# Patient Record
Sex: MALE | Race: WHITE | NOT HISPANIC OR LATINO | Employment: OTHER | URBAN - METROPOLITAN AREA
[De-identification: names, ages, dates, MRNs, and addresses within clinical notes are randomized per-mention and may not be internally consistent; named-entity substitution may affect disease eponyms.]

---

## 2021-03-18 ENCOUNTER — IMMUNIZATION (OUTPATIENT)
Dept: VACCINE CLINIC | Facility: HOSPITAL | Age: 46
End: 2021-03-18

## 2021-03-18 PROCEDURE — 0001A: CPT | Performed by: INTERNAL MEDICINE

## 2021-03-18 PROCEDURE — 91300 HC SARSCOV02 VAC 30MCG/0.3ML IM: CPT | Performed by: INTERNAL MEDICINE

## 2021-04-08 ENCOUNTER — IMMUNIZATION (OUTPATIENT)
Dept: VACCINE CLINIC | Facility: HOSPITAL | Age: 46
End: 2021-04-08

## 2021-04-08 PROCEDURE — 91300 HC SARSCOV02 VAC 30MCG/0.3ML IM: CPT | Performed by: INTERNAL MEDICINE

## 2021-04-08 PROCEDURE — 0002A: CPT | Performed by: INTERNAL MEDICINE

## 2022-09-30 ENCOUNTER — OFFICE VISIT (OUTPATIENT)
Dept: INTERNAL MEDICINE | Facility: CLINIC | Age: 47
End: 2022-09-30

## 2022-09-30 VITALS
HEART RATE: 70 BPM | WEIGHT: 198.3 LBS | HEIGHT: 71 IN | OXYGEN SATURATION: 97 % | BODY MASS INDEX: 27.76 KG/M2 | DIASTOLIC BLOOD PRESSURE: 74 MMHG | SYSTOLIC BLOOD PRESSURE: 114 MMHG

## 2022-09-30 DIAGNOSIS — R19.05 PERIUMBILICAL MASS: ICD-10-CM

## 2022-09-30 DIAGNOSIS — Z00.00 HEALTHCARE MAINTENANCE: Primary | ICD-10-CM

## 2022-09-30 DIAGNOSIS — F17.210 CIGARETTE SMOKER: ICD-10-CM

## 2022-09-30 DIAGNOSIS — Z12.5 PROSTATE CANCER SCREENING: ICD-10-CM

## 2022-09-30 PROCEDURE — 99213 OFFICE O/P EST LOW 20 MIN: CPT | Performed by: FAMILY MEDICINE

## 2022-09-30 PROCEDURE — 99386 PREV VISIT NEW AGE 40-64: CPT | Performed by: FAMILY MEDICINE

## 2022-09-30 PROCEDURE — 90686 IIV4 VACC NO PRSV 0.5 ML IM: CPT | Performed by: FAMILY MEDICINE

## 2022-09-30 PROCEDURE — 90471 IMMUNIZATION ADMIN: CPT | Performed by: FAMILY MEDICINE

## 2022-09-30 NOTE — PROGRESS NOTES
Subjective   Juan Erazo is a 47 y.o. male.     Chief Complaint   Patient presents with   • new patient visit   • Annual Exam     Health maintenance    History of Present Illness   Juan Erazo 47 y.o. male who presents for an Annual Wellness Visit.  he has a history of   Patient Active Problem List   Diagnosis   • Healthcare maintenance   • Cigarette smoker   • Prostate cancer screening   • Periumbilical mass   .  he has been feeling well.   I  reviewed health maintenance with him as part of my preventative care plan.  Has regular dental and eye exams  The following portions of the patient's history were reviewed and updated as appropriate: allergies, current medications, past family history, past medical history, past social history, past surgical history and problem list.    Review of Systems   Constitutional: Negative for appetite change, fever and unexpected weight change.   HENT: Negative for ear pain, facial swelling and sore throat.    Eyes: Negative for pain and visual disturbance.   Respiratory: Negative for chest tightness, shortness of breath and wheezing.    Cardiovascular: Negative for chest pain and palpitations.   Gastrointestinal: Negative for abdominal pain and blood in stool.   Endocrine: Negative.    Genitourinary: Negative for difficulty urinating and hematuria.   Musculoskeletal: Negative for joint swelling.   Neurological: Negative for tremors, seizures and syncope.   Hematological: Negative for adenopathy.   Psychiatric/Behavioral: Negative.        Objective   Physical Exam  Vitals and nursing note reviewed.   Constitutional:       Appearance: Normal appearance. He is well-developed. He is not diaphoretic.   HENT:      Head: Normocephalic and atraumatic.      Right Ear: Tympanic membrane, ear canal and external ear normal.      Left Ear: Tympanic membrane, ear canal and external ear normal.   Eyes:      General: Lids are normal. No scleral icterus.     Extraocular Movements:  Extraocular movements intact.      Conjunctiva/sclera: Conjunctivae normal.   Neck:      Thyroid: No thyroid mass or thyromegaly.      Vascular: No carotid bruit or JVD.   Cardiovascular:      Rate and Rhythm: Normal rate and regular rhythm.      Pulses: Normal pulses.           Radial pulses are 2+ on the right side and 2+ on the left side.      Heart sounds: Normal heart sounds. No murmur heard.  Pulmonary:      Effort: Pulmonary effort is normal. No respiratory distress.      Breath sounds: Normal breath sounds.   Abdominal:      Palpations: Abdomen is soft.      Tenderness: There is no right CVA tenderness or left CVA tenderness.   Musculoskeletal:      Cervical back: Normal range of motion.      Right lower leg: No edema.      Left lower leg: No edema.   Skin:     General: Skin is warm and dry.      Coloration: Skin is not pale.      Findings: No erythema or rash.   Neurological:      General: No focal deficit present.      Mental Status: He is alert and oriented to person, place, and time.      Sensory: No sensory deficit.      Deep Tendon Reflexes: Reflexes are normal and symmetric.   Psychiatric:         Mood and Affect: Mood normal.         Behavior: Behavior normal. Behavior is cooperative.         Thought Content: Thought content normal.         Judgment: Judgment normal.         Assessment & Plan   Diagnoses and all orders for this visit:    1. Healthcare maintenance (Primary)  -     FluLaval/Fluarix/Fluzone >6 Months  -     Hepatitis C Antibody  -     Comprehensive Metabolic Panel  -     Lipid Panel  -     Cologuard - Stool, Per Rectum; Future    2. Prostate cancer screening  -     PSA Screen    3. Cigarette smoker    4. Periumbilical mass    Continue attempts at healthy lifestyle calorie appropriate diet and regular physical activity  Patient counseled regarding early detection of cancers with screening Cologuard has been ordered as well as PSA  Recommend smoking cessation  Ongoing management of  chronic medical problems or acute issues as needed otherwise preventatively annual

## 2022-09-30 NOTE — PROGRESS NOTES
"Chief Complaint  new patient visit and Annual Exam    Subjective        Juan Erazo presents to [facility]  History of Present Illness  New patient with acute problem of nodule around his umbilicus that he has noticed over the last few weeks there is no change in bowel or bladder habits he has had a distant laparoscopic cholecystectomy is not specifically tender  Objective   Vital Signs:  /74 (BP Location: Right arm, Patient Position: Sitting, Cuff Size: Adult)   Pulse 70   Ht 180.3 cm (71\")   Wt 89.9 kg (198 lb 4.8 oz)   SpO2 97%   BMI 27.66 kg/m²   Estimated body mass index is 27.66 kg/m² as calculated from the following:    Height as of this encounter: 180.3 cm (71\").    Weight as of this encounter: 89.9 kg (198 lb 4.8 oz).          Physical Exam  Abdominal:          Comments: Well-healed scars small nodule superior umbilicus  Has an accompanying ventral diastases        Result Review :                Assessment and Plan   Diagnoses and all orders for this visit:    1. Healthcare maintenance (Primary)  -     FluLaval/Fluarix/Fluzone >6 Months  -     Hepatitis C Antibody  -     Comprehensive Metabolic Panel  -     Lipid Panel  -     Cologuard - Stool, Per Rectum; Future    2. Prostate cancer screening  -     PSA Screen    3. Cigarette smoker    4. Periumbilical mass  -     Ambulatory Referral to General Surgery    Patient will consult general surgery for suspected incisional hernia         Follow Up   Return if symptoms worsen or fail to improve, for Recheck.  Patient was given instructions and counseling regarding his condition or for health maintenance advice. Please see specific information pulled into the AVS if appropriate.       "

## 2022-10-11 ENCOUNTER — LAB (OUTPATIENT)
Dept: LAB | Facility: HOSPITAL | Age: 47
End: 2022-10-11

## 2022-10-11 LAB
ALBUMIN SERPL-MCNC: 4.5 G/DL (ref 3.5–5.2)
ALBUMIN/GLOB SERPL: 1.9 G/DL
ALP SERPL-CCNC: 68 U/L (ref 39–117)
ALT SERPL W P-5'-P-CCNC: 18 U/L (ref 1–41)
ANION GAP SERPL CALCULATED.3IONS-SCNC: 8.1 MMOL/L (ref 5–15)
AST SERPL-CCNC: 15 U/L (ref 1–40)
BILIRUB SERPL-MCNC: 0.7 MG/DL (ref 0–1.2)
BUN SERPL-MCNC: 12 MG/DL (ref 6–20)
BUN/CREAT SERPL: 11.8 (ref 7–25)
CALCIUM SPEC-SCNC: 9.6 MG/DL (ref 8.6–10.5)
CHLORIDE SERPL-SCNC: 106 MMOL/L (ref 98–107)
CHOLEST SERPL-MCNC: 167 MG/DL (ref 0–200)
CO2 SERPL-SCNC: 26.9 MMOL/L (ref 22–29)
CREAT SERPL-MCNC: 1.02 MG/DL (ref 0.76–1.27)
EGFRCR SERPLBLD CKD-EPI 2021: 90.1 ML/MIN/1.73
GLOBULIN UR ELPH-MCNC: 2.4 GM/DL
GLUCOSE SERPL-MCNC: 115 MG/DL (ref 65–99)
HCV AB SER DONR QL: NORMAL
HDLC SERPL-MCNC: 40 MG/DL (ref 40–60)
LDLC SERPL CALC-MCNC: 97 MG/DL (ref 0–100)
LDLC/HDLC SERPL: 2.31 {RATIO}
POTASSIUM SERPL-SCNC: 4.6 MMOL/L (ref 3.5–5.2)
PROT SERPL-MCNC: 6.9 G/DL (ref 6–8.5)
PSA SERPL-MCNC: 0.55 NG/ML (ref 0–4)
SODIUM SERPL-SCNC: 141 MMOL/L (ref 136–145)
TRIGL SERPL-MCNC: 173 MG/DL (ref 0–150)
VLDLC SERPL-MCNC: 30 MG/DL (ref 5–40)

## 2022-10-11 PROCEDURE — 80053 COMPREHEN METABOLIC PANEL: CPT | Performed by: FAMILY MEDICINE

## 2022-10-11 PROCEDURE — G0103 PSA SCREENING: HCPCS | Performed by: FAMILY MEDICINE

## 2022-10-11 PROCEDURE — 86803 HEPATITIS C AB TEST: CPT | Performed by: FAMILY MEDICINE

## 2022-10-11 PROCEDURE — 36415 COLL VENOUS BLD VENIPUNCTURE: CPT | Performed by: FAMILY MEDICINE

## 2022-10-11 PROCEDURE — 80061 LIPID PANEL: CPT | Performed by: FAMILY MEDICINE

## 2022-10-12 DIAGNOSIS — R73.09 ELEVATED GLUCOSE: Primary | ICD-10-CM

## 2022-10-20 ENCOUNTER — OFFICE VISIT (OUTPATIENT)
Dept: SURGERY | Facility: CLINIC | Age: 47
End: 2022-10-20

## 2022-10-20 VITALS — BODY MASS INDEX: 27.89 KG/M2 | WEIGHT: 199.2 LBS | HEIGHT: 71 IN

## 2022-10-20 DIAGNOSIS — K42.9 UMBILICAL HERNIA WITHOUT OBSTRUCTION AND WITHOUT GANGRENE: Primary | ICD-10-CM

## 2022-10-20 PROCEDURE — 99203 OFFICE O/P NEW LOW 30 MIN: CPT | Performed by: SURGERY

## 2022-10-20 NOTE — PROGRESS NOTES
Subjective   Juan Erazo is a 47 y.o. male who presents to the office in surgical consultation from Kishor Sanchez MD for an umbilical nodule.    History of Present Illness     The patient recently noticed a painful nodule at the umbilicus.  He tried to feel the area that was uncomfortable and applied pressure which cause it to reduce.  The pain was a burning pain that was immediately relieved when he reduced it.  He now has intermittent symptoms that are mild and resolved with reduction.  He has not noticed a visible bulge.  He has not had any change in his bowel or bladder function.  He had a laparoscopic cholecystectomy in 2010 with an umbilical port site.    Review of Systems   Constitutional: Negative for fatigue and fever.   Respiratory: Negative for chest tightness and shortness of breath.    Cardiovascular: Negative for chest pain and palpitations.   Gastrointestinal: Positive for abdominal pain. Negative for blood in stool, constipation, diarrhea, nausea and vomiting.     Past Medical History:   Diagnosis Date   • Cholelithiasis approx. 2010    Emergency gallbladder removal surgery at [facility]   • PONV (postoperative nausea and vomiting) approx 2010    After gallbladder surgery I vomited immediately after waking up from the surgery     Past Surgical History:   Procedure Laterality Date   • GALLBLADDER SURGERY      2010     Family History   Problem Relation Age of Onset   • Lung cancer Father    • Emphysema Father    • Cancer Father         Dad was diagnosed with lung cancer and passed away in 2015     Social History     Socioeconomic History   • Marital status: Unknown   Tobacco Use   • Smoking status: Every Day     Packs/day: 1.00     Years: 15.00     Pack years: 15.00     Types: Cigarettes   • Smokeless tobacco: Never   • Tobacco comments:     Have quit several times, mostly for a few months at a time...   Vaping Use   • Vaping Use: Never used   Substance and Sexual Activity   •  Alcohol use: Yes     Alcohol/week: 3.0 standard drinks     Types: 3 Cans of beer per week     Comment: 2-3 per week   • Drug use: Never   • Sexual activity: Not Currently     Partners: Female       Objective   Physical Exam  Constitutional:       Appearance: Normal appearance. He is well-developed. He is not toxic-appearing.   Eyes:      General: No scleral icterus.  Pulmonary:      Effort: Pulmonary effort is normal. No respiratory distress.   Abdominal:      Palpations: Abdomen is soft.      Tenderness: There is no abdominal tenderness.      Hernia: A hernia is present. Hernia is present in the umbilical area (Small reducible umbilical hernia that contains preperitoneal fat).   Skin:     General: Skin is warm and dry.   Neurological:      Mental Status: He is alert and oriented to person, place, and time.   Psychiatric:         Behavior: Behavior normal.         Thought Content: Thought content normal.         Judgment: Judgment normal.         Assessment & Plan     1.  Umbilical hernia: The patient has a small umbilical hernia that is intermittently symptomatic.  It is easily reducible.  The defect is too small to permit the small bowel.  This was discussed with the patient.  Based on the small size of the umbilical hernia, there is no need to proceed with an umbilical hernia repair at this time.  The patient was advised that he has no limitations in his activity.  He will return to the office if the symptoms worsen or if he starts to notice a bulge at the umbilicus.

## 2023-10-06 ENCOUNTER — OFFICE VISIT (OUTPATIENT)
Dept: INTERNAL MEDICINE | Facility: CLINIC | Age: 48
End: 2023-10-06
Payer: COMMERCIAL

## 2023-10-06 ENCOUNTER — HOSPITAL ENCOUNTER (OUTPATIENT)
Dept: GENERAL RADIOLOGY | Facility: HOSPITAL | Age: 48
Discharge: HOME | End: 2023-10-06
Admitting: FAMILY MEDICINE
Payer: COMMERCIAL

## 2023-10-06 VITALS
DIASTOLIC BLOOD PRESSURE: 68 MMHG | TEMPERATURE: 97.8 F | OXYGEN SATURATION: 98 % | SYSTOLIC BLOOD PRESSURE: 110 MMHG | HEIGHT: 71 IN | BODY MASS INDEX: 28.25 KG/M2 | HEART RATE: 87 BPM | WEIGHT: 201.8 LBS

## 2023-10-06 DIAGNOSIS — Z00.00 HEALTHCARE MAINTENANCE: Primary | ICD-10-CM

## 2023-10-06 DIAGNOSIS — Z23 FLU VACCINE NEED: ICD-10-CM

## 2023-10-06 DIAGNOSIS — M79.671 PAIN OF RIGHT HEEL: ICD-10-CM

## 2023-10-06 DIAGNOSIS — Z12.5 PROSTATE CANCER SCREENING: ICD-10-CM

## 2023-10-06 DIAGNOSIS — F17.210 CIGARETTE SMOKER: ICD-10-CM

## 2023-10-06 PROCEDURE — 73650 X-RAY EXAM OF HEEL: CPT

## 2023-10-06 NOTE — PROGRESS NOTES
"Chief Complaint  Foot Pain    Subjective        Juan Erazo presents to [facility]  History of Present Illness  Acute right heel pain last few months seem be worse with treadmill he stopped and seem to improve she was her body-year-old the emergency room he is every day no serious 3 of foot problems  No known trauma  Objective   Vital Signs:  /68   Pulse 87   Temp 97.8 °F (36.6 °C)   Ht 180.3 cm (70.98\")   Wt 91.5 kg (201 lb 12.8 oz)   SpO2 98%   BMI 28.16 kg/m²   Estimated body mass index is 28.16 kg/m² as calculated from the following:    Height as of this encounter: 180.3 cm (70.98\").    Weight as of this encounter: 91.5 kg (201 lb 12.8 oz).               Physical Exam  Musculoskeletal:        Feet:    Feet:      Right foot:      Skin integrity: Callus, dry skin and fissure present.      Comments: No medial or lateral foot around the heel tenderness       Result Review :                   Assessment and Plan   Diagnoses and all orders for this visit:        1. Pain of right heel  -     XR calcaneUS 2+ vw right    Trial of heel cup follow-up results of x-ray         Follow Up   Return if symptoms worsen or fail to improve, for Recheck.  Patient was given instructions and counseling regarding his condition or for health maintenance advice. Please see specific information pulled into the AVS if appropriate.         "

## 2023-10-06 NOTE — PROGRESS NOTES
Subjective   Juan Erazo is a 48 y.o. male.     Chief Complaint   Patient presents with    Annual Exam         History of Present Illness   Juan Erazo 48 y.o. male who presents for an Annual Wellness Visit.  he has a history of   Patient Active Problem List   Diagnosis    Healthcare maintenance    Cigarette smoker    Prostate cancer screening    Periumbilical mass    Pain of right heel   .  he has been feeling well.   I  reviewed health maintenance with him as part of my preventative care plan.  The following portions of the patient's history were reviewed and updated as appropriate: allergies, current medications, past family history, past medical history, past social history, past surgical history, and problem list.  [facility] Low-Dose Lung Cancer CT Screening     Juan Erazo is a 48 y.o.  male with whom I discussed Low-Dose Lung Cancer Screening today.     SMOKING HISTORY:   Social History     Tobacco Use   Smoking Status Every Day    Packs/day: 1.00    Years: 15.00    Pack years: 15.00    Types: Cigarettes   Smokeless Tobacco Never   Tobacco Comments    Have quit several times, mostly for a few months at a time...       Juan Erazo is currently smoking 1 pack(s) per day with a 30 pack year history.  he reports no use of alternate forms of tobacco, electronic cigarettes, marijuana or other substances.  Based on the recommendation of the [place] Preventive Services Task Force, this patient is at high risk for lung cancer and a low-dose CT screening scan is recommended.     We discussed the connection between Radon and Lung Cancer and the availability of free test kits. Mr. Erazo has had  second-hand smoke exposure as a child with smoking in their home.    The patient has had no hemoptysis, unintentional weight loss or increasing shortness of breath. The patient is asymptomatic and has no signs or symptoms of lung cancer.     Together we discussed the potential benefits  and potential harms of being screened for lung cancer including the potential for follow up diagnostic testing, risk for over diagnosis, false positive rate and radiation exposure using the [facility] standardized decision aid. We reviewed the patient's smoking history and counseled on the importance and health benefits of maintaining cigarette smoking abstinence.      Smoking Abstinence  DISCUSSION HELD TODAY:     We discussed that there are a number of resources and interventions to assist with smoking cessation if needed in the future including the 1-800-Quit Now line, Health Department programs, [place] [facility] resources, and use of the [place] Department of Health and Human Services five keys for quitting and quit plan   Recommendations for continued lung cancer screening:      We discussed the NCCN guidelines for lung cancer screening and the patient verbalized understanding that annual screening is recommended until fifteen years beyond smoking as long as they have no other disease or comorbidity that would prevent them from receiving cancer treatments such as surgery should a lung cancer be detected. The [facility] Lung Cancer Screening Shared Decision-Making Tool was utilized as an aid in discussing whether or not screening was right. The patient has decided to proceed with a Low Dose Lung Cancer Screening CT today. The patient is aware that the results of his screening will be shared with the referring provider or PCP as well.       The patient verbalizes understanding that results of this low dose lung CT exam will be called to him and that assistance will be provided in arranging any necessary follow-up.    After review of the NCCN guidelines and recommendations for ongoing screening, the patient verbalized understanding of recommendations for follow-up. We discussed the importance of continued annual screening until 15 years beyond smoking or until other life-limiting  comorbidities are present. We discussed the impact of comorbidities and ability and willing to undergo diagnosis and treatment.    3 minutes out of 20 minutes face-to-face spent counseling patient on the continued health benefits of having quit tobacco, maintaining smoking abstinence, smoking cessation strategies and resources, as well as the importance of adherence to annual lung cancer low-dose CT screening.   Review of Systems   Constitutional:  Negative for appetite change, fever and unexpected weight change.   HENT:  Negative for ear pain, facial swelling and sore throat.    Eyes:  Negative for pain and visual disturbance.   Respiratory:  Negative for chest tightness, shortness of breath and wheezing.    Cardiovascular:  Negative for chest pain and palpitations.   Gastrointestinal:  Negative for abdominal pain and blood in stool.   Endocrine: Negative.    Genitourinary:  Negative for difficulty urinating and hematuria.   Musculoskeletal:  Negative for joint swelling.   Neurological:  Negative for tremors, seizures and syncope.   Hematological:  Negative for adenopathy.   Psychiatric/Behavioral: Negative.       Objective   Physical Exam  Vitals and nursing note reviewed.   Constitutional:       Appearance: Normal appearance. He is well-developed. He is not diaphoretic.   HENT:      Head: Normocephalic and atraumatic.      Right Ear: Tympanic membrane, ear canal and external ear normal.      Left Ear: Tympanic membrane, ear canal and external ear normal.   Eyes:      General: Lids are normal. No scleral icterus.     Extraocular Movements: Extraocular movements intact.      Conjunctiva/sclera: Conjunctivae normal.   Neck:      Thyroid: No thyroid mass or thyromegaly.      Vascular: No carotid bruit or JVD.   Cardiovascular:      Rate and Rhythm: Normal rate and regular rhythm.      Pulses: Normal pulses.           Radial pulses are 2+ on the right side and 2+ on the left side.      Heart sounds: Normal heart  sounds. No murmur heard.  Pulmonary:      Effort: Pulmonary effort is normal. No respiratory distress.      Breath sounds: Normal breath sounds.   Abdominal:      Palpations: Abdomen is soft.      Tenderness: There is no right CVA tenderness or left CVA tenderness.   Musculoskeletal:      Cervical back: Normal range of motion.      Right lower leg: No edema.      Left lower leg: No edema.   Skin:     General: Skin is warm and dry.      Coloration: Skin is not pale.      Findings: No erythema or rash.   Neurological:      General: No focal deficit present.      Mental Status: He is alert and oriented to person, place, and time.      Sensory: No sensory deficit.      Deep Tendon Reflexes: Reflexes are normal and symmetric.   Psychiatric:         Mood and Affect: Mood normal.         Behavior: Behavior normal. Behavior is cooperative.         Thought Content: Thought content normal.         Judgment: Judgment normal.       Assessment & Plan   Diagnoses and all orders for this visit:    1. Healthcare maintenance (Primary)  -     Hemoglobin A1c; Future  -     Lipid Panel; Future  -     Fluzone High-Dose 65+yrs    2. Cigarette smoker  -     CT Chest Low Dose Wo; Future    3. Prostate cancer screening  -     PSA Screen; Future    4. Pain of right heel  -     XR calcaneUS 2+ vw right      Continue attempts at healthy lifestyle calorie appropriate diet and regular physical activity  Education provided regarding prevention of serious illness with immunizations recommend shingles vaccine flu vaccine pneumonia vaccine  Provided regarding early detection and screening patient is current with colorectal cancer screening we will obtain PSA as well as low-dose CT scan due to smoking history  Follow-up ongoing management of chronic problems otherwise as needed or preventatively annually

## 2023-10-12 DIAGNOSIS — M79.671 PAIN OF RIGHT HEEL: Primary | ICD-10-CM

## 2023-11-07 ENCOUNTER — LAB (OUTPATIENT)
Dept: LAB | Facility: HOSPITAL | Age: 48
End: 2023-11-07
Payer: COMMERCIAL

## 2023-11-07 ENCOUNTER — HOSPITAL ENCOUNTER (OUTPATIENT)
Dept: CT IMAGING | Facility: HOSPITAL | Age: 48
Discharge: HOME | End: 2023-11-07
Admitting: FAMILY MEDICINE
Payer: COMMERCIAL

## 2023-11-07 DIAGNOSIS — F17.210 CIGARETTE SMOKER: ICD-10-CM

## 2023-11-07 PROCEDURE — G0103 PSA SCREENING: HCPCS | Performed by: FAMILY MEDICINE

## 2023-11-07 PROCEDURE — 80061 LIPID PANEL: CPT | Performed by: FAMILY MEDICINE

## 2023-11-07 PROCEDURE — 71271 CT THORAX LUNG CANCER SCR C-: CPT

## 2023-11-07 PROCEDURE — 83036 HEMOGLOBIN GLYCOSYLATED A1C: CPT | Performed by: FAMILY MEDICINE

## 2023-11-21 ENCOUNTER — OFFICE VISIT (OUTPATIENT)
Dept: PODIATRY | Facility: CLINIC | Age: 48
End: 2023-11-21
Payer: COMMERCIAL

## 2023-11-21 VITALS — BODY MASS INDEX: 28.15 KG/M2 | RESPIRATION RATE: 18 BRPM | WEIGHT: 201.1 LBS | HEIGHT: 71 IN

## 2023-11-21 DIAGNOSIS — B35.3 TINEA PEDIS OF RIGHT FOOT: ICD-10-CM

## 2023-11-21 DIAGNOSIS — M72.2 PLANTAR FASCIITIS, RIGHT: ICD-10-CM

## 2023-11-21 DIAGNOSIS — M77.31 HEEL SPUR, RIGHT: ICD-10-CM

## 2023-11-21 DIAGNOSIS — L85.3 XEROSIS OF SKIN: Primary | ICD-10-CM

## 2023-11-21 RX ORDER — CLOTRIMAZOLE AND BETAMETHASONE DIPROPIONATE 10; .64 MG/G; MG/G
1 CREAM TOPICAL 2 TIMES DAILY
Qty: 45 G | Refills: 2 | Status: SHIPPED | OUTPATIENT
Start: 2023-11-21

## 2023-11-21 RX ORDER — METHYLPREDNISOLONE 4 MG/1
TABLET ORAL
Qty: 21 TABLET | Refills: 0 | Status: SHIPPED | OUTPATIENT
Start: 2023-11-21

## 2023-11-21 NOTE — PROGRESS NOTES
"11/21/2023  Foot and Ankle Surgery - New Patient   Provider: NINA Barillas   Location: [facility]    Subjective:  Juan Erazo is a 48 y.o. male.     Chief Complaint   Patient presents with    Right Foot - Pain       The patient is here today as a new patient with right heel pain.    The patient reports pain in his right arch and heel that began 02/2023. He was walking on the treadmill every day, but one day it got so bad and he was limping, causing him to take one week off. He denies any fall or injury. He has utilized Tylenol for his pain. He notes the pain is worse in the morning. He describes his foot as feeling \"tight\" and \"twisted\" but eventually loosens up. The patient reports feeling aching and throbbing if he ambulates or stands on his feet for a couple of hours. The patient works from home for [employer]. He rates the pain as 5/10 first thing in the morning. He completed an x-ray in 10/2022 and was advised he had a bone spur. He thought it might be plantar fasciitis. The patient notes the pain occasionally radiates up his leg.    He reports dry skin around his feet and itching between his toes. He reports spots on his chest that are discolored when he goes out in the sun. He notes he has had them all his life. He denies ever seeing a dermatologist.    No Known Allergies    Past Medical History:   Diagnosis Date    Cholelithiasis approx. 2010    Emergency gallbladder removal surgery at [facility]    Difficulty walking 3/2023    Right foot sore first thing in the morning and when i walk or stand for couple hours    PONV (postoperative nausea and vomiting) approx 2010    After gallbladder surgery I vomited immediately after waking up from the surgery       Past Surgical History:   Procedure Laterality Date    GALLBLADDER SURGERY      2010       Family History   Problem Relation Age of Onset    Lung cancer Father     Emphysema Father     Cancer Father         Dad was " "diagnosed with lung cancer and passed away in 2015       Social History     Socioeconomic History    Marital status: Unknown   Tobacco Use    Smoking status: Every Day     Packs/day: 0.50     Years: 15.00     Additional pack years: 0.00     Total pack years: 7.50     Types: Cigarettes    Smokeless tobacco: Never    Tobacco comments:     Have quit several times, mostly for a few months at a time...   Vaping Use    Vaping Use: Never used   Substance and Sexual Activity    Alcohol use: Yes     Alcohol/week: 3.0 standard drinks of alcohol     Types: 3 Cans of beer per week     Comment: 2-3 per week    Drug use: Never    Sexual activity: Not Currently     Partners: Female        No current outpatient medications on file prior to visit.     No current facility-administered medications on file prior to visit.       Objective   Resp 18   Ht 180.3 cm (71\")   Wt 91.2 kg (201 lb 1.6 oz)   BMI 28.05 kg/m²     Foot/Ankle Exam    GENERAL  Appearance:  appears stated age  Orientation:  AAOx3  Affect:  appropriate  Gait:  unimpaired  Assistance:  independent    VASCULAR     Right Foot Vascularity   Normal vascular exam    Dorsalis pedis:  2+  Posterior tibial:  2+  Skin temperature:  warm  Edema grading:  None  CFT:  < 3 seconds  Pedal hair growth:  Present  Varicosities:  none     NEUROLOGIC     Right Foot Neurologic   Light touch sensation: normal    MUSCULOSKELETAL     Right Foot Musculoskeletal   Ecchymosis:  none  Tenderness:  plantar arch tenderness and plantar fascia tenderness      MUSCLE STRENGTH     Right Foot Muscle Strength   Foot dorsiflexion:  5  Foot plantar flexion:  5     Left Foot Muscle Strength   Foot dorsiflexion:  5  Foot plantar flexion:  5    DERMATOLOGIC      Right Foot Dermatologic   Skin  Positive for dryness.   Nails  1.  Normal.  2.  Normal.  4.  Normal.  5.  Normal.        Assessment & Plan   Diagnoses and all orders for this visit:    1. Xerosis of skin (Primary)    2. Tinea pedis of right " foot    3. Plantar fasciitis, right    4. Heel spur, right    Other orders  -     methylPREDNISolone (MEDROL) 4 MG dose pack; Take as directed on package instructions.  Dispense: 21 tablet; Refill: 0  -     clotrimazole-betamethasone (LOTRISONE) 1-0.05 % cream; Apply 1 application  topically to the appropriate area as directed 2 (Two) Times a Day. Apply to affected area twice daily  Dispense: 45 g; Refill: 2      1. Right heel pain.  I do feel patient has signs and symptoms consistent with plantar fasciitis. X-ray image from several months ago of the right heel was reviewed with patient. Patient does have very small heel spur. Discussed pathophysiology of plantar fasciitis and heel spur and discussed treatment options at length. We'll recommend patient proceed with over-the-counter arch support with metatarsal pad, decrease overall activity, calf stretching exercises, ice, and Medrol Dosepak. In addition, patient reports dry skin and itching between his toes and dry skin to bilateral plantar foot. Additionally, patient reports color change in rash. Patient has rash and skin color changes to arm fold to bilateral arms. We will recommend Lotrisone to bilateral feet twice a day to treat tinea and likely eczema or psoriasis to bilateral feet. Discussed with patient that I would also recommend he consider consultation with dermatology. Patient verbalized understanding. And agrees with treatment plan. At this time, we will have him follow up with me in 4 to 6 weeks for reevaluation and is encouraged to call with any questions or concerns should they arise for scheduled appointment.    No orders of the defined types were placed in this encounter.        Transcribed from ambient dictation for NINA Jane by Claire Maurer.  11/21/23   17:26 EST    Patient or patient representative verbalized consent to the visit recording.  I have personally performed the services described in this document as transcribed  by the above individual, and it is both accurate and complete.  Isabella Henriquez, APRN  11/22/2023  07:45 EST

## 2023-12-05 DIAGNOSIS — L85.3 XEROSIS OF SKIN: Primary | ICD-10-CM

## 2023-12-05 NOTE — TELEPHONE ENCOUNTER
Caller: CONNOR     Relationship: SELF    Best call back number: [phone] (home)       Requested Prescriptions:   Requested Prescriptions     Pending Prescriptions Disp Refills    clotrimazole-betamethasone (LOTRISONE) 1-0.05 % cream 45 g 2     Sig: Apply 1 application  topically to the appropriate area as directed 2 (Two) Times a Day. Apply to affected area twice daily        Pharmacy where request should be sent: [facility] [phone] [facility] [id number] [facility] AT [facility] [phone] [facility] [phone] FX     Last office visit with prescribing clinician: 11/21/2023   Last telemedicine visit with prescribing clinician: Visit date not found   Next office visit with prescribing clinician: 1/12/2024     Additional details provided by patient:  NEEDS REFILLS UNTIL JANUARY 12TH     Does the patient have less than a 3 day supply:  [x] Yes  [] No    Would you like a call back once the refill request has been completed: [] Yes [x] No    If the office needs to give you a call back, can they leave a voicemail: [] Yes [] No    Mary Frances, PCT   12/05/23 13:49 EST

## 2023-12-07 RX ORDER — CLOTRIMAZOLE AND BETAMETHASONE DIPROPIONATE 10; .64 MG/G; MG/G
1 CREAM TOPICAL 2 TIMES DAILY
Qty: 45 G | Refills: 2 | Status: SHIPPED | OUTPATIENT
Start: 2023-12-07

## 2023-12-07 NOTE — TELEPHONE ENCOUNTER
Called pt and discussed bilat foot skin dryness/rash has improved. Will reorder lotrisone today with 2 refills and directed him to use once day to bilat feet daily. Once lotrisone completed use over the counter athletes foot spray and moisturize feet daily. Will also place dermatology consult.

## 2024-01-05 ENCOUNTER — TELEPHONE (OUTPATIENT)
Dept: INTERNAL MEDICINE | Facility: CLINIC | Age: 49
End: 2024-01-05

## 2024-01-05 ENCOUNTER — TELEPHONE (OUTPATIENT)
Dept: INTERNAL MEDICINE | Facility: CLINIC | Age: 49
End: 2024-01-05
Payer: COMMERCIAL

## 2024-01-05 DIAGNOSIS — R06.83 SNORING: Primary | ICD-10-CM

## 2024-01-05 NOTE — TELEPHONE ENCOUNTER
"    Caller: Juan Erazo \"CONNOR\"    Relationship: Self    Best call back number: [phone]    What test was performed: LABS     When was the test performed: PHYSICAL IN OCTOBER     Where was the test performed: OFFICE    Additional notes: PATIENT STATES THAT HE NEVER HEARD BACK FROM ANYONE REGARDING HIS TESTS. HE WOULD LIKE A CALL TO GO OVER THESE AS SOON AS POSSIBLE.         "

## 2024-01-05 NOTE — TELEPHONE ENCOUNTER
"  Caller: Juan Erazo \"CONNOR\"    Relationship: Self    Best call back number: [phone]    What is the medical concern/diagnosis: SNORING ISSUE    What specialty or service is being requested: ENT    What is the provider, practice or medical service name: PATIENT WOULD LIKE A FACILITY NEAR [place]     Any additional details: PLEASE ADVISE PATIENT ON NEXT STEPS OF CARE ASAP.       "

## 2024-01-12 ENCOUNTER — OFFICE VISIT (OUTPATIENT)
Dept: PODIATRY | Facility: CLINIC | Age: 49
End: 2024-01-12
Payer: COMMERCIAL

## 2024-01-12 VITALS — WEIGHT: 201 LBS | HEIGHT: 71 IN | BODY MASS INDEX: 28.14 KG/M2 | RESPIRATION RATE: 20 BRPM

## 2024-01-12 DIAGNOSIS — B35.3 TINEA PEDIS OF RIGHT FOOT: ICD-10-CM

## 2024-01-12 DIAGNOSIS — M72.2 PLANTAR FASCIITIS, RIGHT: ICD-10-CM

## 2024-01-12 DIAGNOSIS — L85.3 XEROSIS OF SKIN: Primary | ICD-10-CM

## 2024-01-12 NOTE — PATIENT INSTRUCTIONS
Recommended Over-The-Counter Foot Creams/Balms for Dry, Cracked Feet:    Urea 40% Cream      2. O'Vidal's Healthy Feet Foot Cream    3. AmLactin Foot Repair      4. Eucerin Advanced Repair Cream      5. CeraVe Renewing SA Foot Cream      6. Bag Humptulips Hand & Body Ointment

## 2024-01-12 NOTE — PROGRESS NOTES
"01/12/2024  Foot and Ankle Surgery - Established Patient/Follow-up  Provider: NINA Barillas   Location: [facility]    Subjective:  Juan Erazo is a 48 y.o. male.     Chief Complaint   Patient presents with    Right Foot - Pain, Follow-up     Heel     Follow-up     SHIRIN Sanchez MD 10/06/2023       The patient is here today for follow-up for right heel pain.    His foot is doing much better. He was given a steroid pack for 1 week at his last visit. He also did stretches and used insoles in his shoes. He rates his pain as a 1 or 2 out of 10 when it is bothering him. It usually happens first thing in the morning, but not every morning. He has his workout shoes, but he has not started the treadmill yet.    The ointment has made a huge difference. He was using Lotrisone on both feet and got a refill. He has not seen a dermatologist yet.    No Known Allergies    Current Outpatient Medications on File Prior to Visit   Medication Sig Dispense Refill    clotrimazole-betamethasone (LOTRISONE) 1-0.05 % cream Apply 1 application  topically to the appropriate area as directed 2 (Two) Times a Day. Apply to affected area twice daily 45 g 2    methylPREDNISolone (MEDROL) 4 MG dose pack Take as directed on package instructions. (Patient not taking: Reported on 1/12/2024) 21 tablet 0     No current facility-administered medications on file prior to visit.       Objective   Resp 20   Ht 180.3 cm (71\")   Wt 91.2 kg (201 lb)   BMI 28.03 kg/m²     Foot/Ankle Exam  Foot/Ankle Exam     GENERAL  Appearance:  appears stated age  Orientation:  AAOx3  Affect:  appropriate  Gait:  unimpaired  Assistance:  independent     VASCULAR      Right Foot Vascularity   Normal vascular exam    Dorsalis pedis:  2+  Posterior tibial:  2+  Skin temperature:  warm  Edema grading:  None  CFT:  < 3 seconds  Pedal hair growth:  Present  Varicosities:  none     NEUROLOGIC      Right Foot Neurologic   Light touch sensation: normal   "   MUSCULOSKELETAL      Right Foot Musculoskeletal   Ecchymosis:  none  Tenderness:  plantar arch tenderness and plantar fascia tenderness       MUSCLE STRENGTH      Right Foot Muscle Strength   Foot dorsiflexion:  5  Foot plantar flexion:  5      Left Foot Muscle Strength   Foot dorsiflexion:  5  Foot plantar flexion:  5     DERMATOLOGIC       Right Foot Dermatologic   Skin  Positive for dryness.   Nails  1.  Normal.  2.  Normal.  4.  Normal.  5.  Normal.          Assessment & Plan   Diagnoses and all orders for this visit:    1. Xerosis of skin (Primary)    2. Tinea pedis of right foot    3. Plantar fasciitis, right      1. Tinea and plantar fasciitis to the right foot.  The patient's symptoms have improved. Tinea has resolved. Plantar fasciitis pain has resolved. I recommended him to continue doing stretching exercises for the calf and foot with rolling it on a tennis ball. I advised him not to walk barefoot and to make sure he has good support in his shoe. I advised him to get insoles on [employer]. I advised him to do lower impact shoes.    Follow-up  The patient will follow up as needed.    No orders of the defined types were placed in this encounter.  Transcribed from ambient dictation for NINA Jane by Arielle Reyes.  01/12/24   10:23 EST    Patient or patient representative verbalized consent to the visit recording.

## 2024-02-23 ENCOUNTER — OFFICE VISIT (OUTPATIENT)
Dept: SLEEP MEDICINE | Facility: HOSPITAL | Age: 49
End: 2024-02-23
Payer: COMMERCIAL

## 2024-02-23 VITALS — HEART RATE: 84 BPM | HEIGHT: 71 IN | OXYGEN SATURATION: 96 % | WEIGHT: 204 LBS | BODY MASS INDEX: 28.56 KG/M2

## 2024-02-23 DIAGNOSIS — E66.3 OVERWEIGHT WITH BODY MASS INDEX (BMI) 25.0-29.9: ICD-10-CM

## 2024-02-23 DIAGNOSIS — R06.83 SNORING: Primary | ICD-10-CM

## 2024-02-23 PROCEDURE — G0463 HOSPITAL OUTPT CLINIC VISIT: HCPCS

## 2024-02-23 NOTE — PROGRESS NOTES
"[facility] New Patient/Consultation       Reason for Consultation: Snoring      Patient Care Team:  Kishor Sanchez MD as PCP - General (Family Medicine)  Jimi Alford MD as Consulting Physician (Sleep Medicine)      History of present illness:  Thank you for asking me to see your patient.  The patient is a 48 y.o. male presents today with concern for sleep disorder.  Patient reports no history of prior sleep study or tonsillectomy.  Sleep latency 5 to 10 minutes sleep 7 to 8 hours 1 nap per day no rotating shifts.  Reports sleep snoring and rare leg jerking at night.  BMI 28.5.  Sleep is restorative.  Nap is more out of habit however does not depend on it and does not feel more tired without it.  Snoring has resolved with dental device made by dentist.  Appointment was made before dental device was received by dentist.    Medical Conditions (PMH):   Negative    Social history:  Do you drive a commercial vehicle:  No   Shift work:  No   Tobacco use:  Yes 1 pack/day  Alcohol use: 1-2 per week  Caffeinated drinks: 2-3 per day    Family History (parents and siblings) (pertaining to sleep medicine):  Negative    Allergies:  Patient has no known allergies.       Current Outpatient Medications:     clotrimazole-betamethasone (LOTRISONE) 1-0.05 % cream, Apply 1 application  topically to the appropriate area as directed 2 (Two) Times a Day. Apply to affected area twice daily, Disp: 45 g, Rfl: 2    methylPREDNISolone (MEDROL) 4 MG dose pack, Take as directed on package instructions. (Patient not taking: Reported on 1/12/2024), Disp: 21 tablet, Rfl: 0    Vital Signs:    Vitals:    02/23/24 0808   Pulse: 84   SpO2: 96%   Weight: 92.5 kg (204 lb)   Height: 180.3 cm (71\")      Body mass index is 28.45 kg/m².  Neck Circumference: 16.5 inches      REVIEW OF SYSTEMS:  Pertinent positive symptoms are:  Snoring  [place] Sleepiness Scale of Total score: 4   Fatigue       Physical exam:  Vitals:    02/23/24 0808   Pulse: " "84   SpO2: 96%   Weight: 92.5 kg (204 lb)   Height: 180.3 cm (71\")    Body mass index is 28.45 kg/m². Neck Circumference: 16.5 inches  HEENT: Head is atraumatic, normocephalic  Eyes: pupils are round equal and reacting to light and accommodation, conjunctiva normal  Throat: tongue normal  NECK:Neck Circumference: 16.5 inches  RESPIRATORY SYSTEM: Regular respirations  CARDIOVASULAR SYSTEM: Regular rate  EXTREMITES: No cyanosis, clubbing  NEUROLOGICAL SYSTEM: Oriented x 3, no gross motor defects, gait normal      Impression:  1. Snoring    2. Overweight with body mass index (BMI) 25.0-29.9        Plan:    Office note(s) from care team reviewed. Office note(s) reviewed: 1/10/2024 PCP    Labs/ Test Results Reviewed:     Most Recent A1C          11/7/2023    08:12   HGBA1C Most Recent   Hemoglobin A1C 5.50    Normal A1c          ASSESSMENT AND PLAN:   Discussed risk of sleep apnea: Patient's symptoms and physical examination are NOT concerning for possible sleep apnea.   I discussed the signs, symptoms, and pathophysiology of sleep apnea with this patient.  I also discussed the potential complications of untreated sleep apnea including but not limited to resistant hypertension, insulin resistance, pulmonary hypertension, atrial fibrillation, heart attack, stroke, nonrestorative sleep with hypersomnia which can increase risk for motor vehicle accidents, etc.  All questions were answered to patient's satisfaction.   Snoring: snoring is the sound created by turbulent airflow vibrating upper airway soft tissue.  I have also discussed factors affecting snoring including sleep deprivation, sleeping on the back and alcohol ingestion. To minimize snoring, patient is advised to have adequate sleep, sleep on their side, and avoid alcohol and sedative medications around bedtime.  Of note snoring has resolved with a new dental device by dentist.  No significant excessive daytime sleepiness:  [place] Sleepiness Scale of Total score: " 4.  There are many causes of excessive daytime sleepiness.  Rule out sleep apnea as a contributing factor, as above.  Do not drive, operate heavy machinery, or do activities that require high concentration if feeling tired/drowsy.  Overweight: Body mass index is 28.45 kg/m².. Patients who are overweight or obese are at increased risk of sleep apnea/ sleep disordered breathing. Weight reduction and healthy lifestyle are encouraged in overweight/ obese patients as part of a comprehensive approach to sleep apnea treatment.      I have also discussed with the patient the following  Sleep hygiene: try to maintain a regular bed time and wake time, avoid watching TV/ using electronic devices in bed (including cell phones), limit caffeinated and alcoholic beverages before bed, try to maintain a cool and quiet sleep environment, avoid daytime naps  Adequate amount of sleep: most people need around 7 to 8 hours of sleep each night    Sleep study not necessary at this point.  If patient sleeps becomes nonrestorative or snoring returns or if there are any further concerns for cardiovascular risk or issue such as high blood pressure, patient will return to clinic and we will consider home sleep study.  Advised to keep a blood pressure log to discuss with primary care.  Patient's questions were answered.  Return to clinic as needed.    Thank you for allowing me to participate in your patient's care.    Jimi Alford MD  Sleep Medicine  02/23/24  08:31 EST

## 2024-06-10 ENCOUNTER — OFFICE VISIT (OUTPATIENT)
Dept: INTERNAL MEDICINE | Facility: CLINIC | Age: 49
End: 2024-06-10
Payer: COMMERCIAL

## 2024-06-10 VITALS
BODY MASS INDEX: 29.26 KG/M2 | WEIGHT: 209 LBS | RESPIRATION RATE: 16 BRPM | OXYGEN SATURATION: 98 % | HEIGHT: 71 IN | SYSTOLIC BLOOD PRESSURE: 130 MMHG | TEMPERATURE: 96.7 F | HEART RATE: 78 BPM | DIASTOLIC BLOOD PRESSURE: 80 MMHG

## 2024-06-10 DIAGNOSIS — L64.9 MALE PATTERN BALDNESS: Primary | ICD-10-CM

## 2024-06-10 PROCEDURE — 99213 OFFICE O/P EST LOW 20 MIN: CPT | Performed by: FAMILY MEDICINE

## 2024-06-10 RX ORDER — FINASTERIDE 1 MG/1
1 TABLET, FILM COATED ORAL DAILY
Qty: 90 TABLET | Refills: 1 | Status: SHIPPED | OUTPATIENT
Start: 2024-06-10

## 2024-06-10 NOTE — PROGRESS NOTES
"Chief Complaint  Alopecia (Pt said his  recommended he start taking Minoxodil)    Subjective        Juan Erazo presents to [facility]  Alopecia      Patient appointment to discuss male pattern baldness mostly in his crown he has been take minoxidil foam 5% once daily for a couple years does not think he is getting adequate benefit like to discuss other treatment options  Objective   Vital Signs:  /80   Pulse 78   Temp 96.7 °F (35.9 °C)   Resp 16   Ht 180.3 cm (70.98\")   Wt 94.8 kg (209 lb)   SpO2 98%   BMI 29.16 kg/m²   Estimated body mass index is 29.16 kg/m² as calculated from the following:    Height as of this encounter: 180.3 cm (70.98\").    Weight as of this encounter: 94.8 kg (209 lb).             Physical Exam  Vitals and nursing note reviewed.   Constitutional:       Appearance: Normal appearance.   HENT:      Head:        Comments: Bulging area  Neurological:      Mental Status: He is alert.        Result Review :                     Assessment and Plan     Diagnoses and all orders for this visit:    1. Male pattern baldness (Primary)    Other orders  -     finasteride (PROPECIA) 1 MG tablet; Take 1 tablet by mouth Daily.  Dispense: 90 tablet; Refill: 1    Continue minoxidil 5% foam twice daily         Follow Up     Return in about 6 months (around 12/10/2024), or if symptoms worsen or fail to improve, for Recheck.  Patient was given instructions and counseling regarding his condition or for health maintenance advice. Please see specific information pulled into the AVS if appropriate.         "

## 2024-09-13 RX ORDER — FINASTERIDE 1 MG/1
1 TABLET, FILM COATED ORAL DAILY
Qty: 90 TABLET | Refills: 3 | Status: SHIPPED | OUTPATIENT
Start: 2024-09-13

## 2024-10-31 ENCOUNTER — OFFICE VISIT (OUTPATIENT)
Dept: INTERNAL MEDICINE | Facility: CLINIC | Age: 49
End: 2024-10-31
Payer: COMMERCIAL

## 2024-10-31 VITALS
DIASTOLIC BLOOD PRESSURE: 78 MMHG | BODY MASS INDEX: 28.03 KG/M2 | HEART RATE: 72 BPM | TEMPERATURE: 97.5 F | OXYGEN SATURATION: 99 % | WEIGHT: 200.2 LBS | HEIGHT: 71 IN | SYSTOLIC BLOOD PRESSURE: 128 MMHG

## 2024-10-31 DIAGNOSIS — Z00.00 HEALTHCARE MAINTENANCE: ICD-10-CM

## 2024-10-31 DIAGNOSIS — F17.210 CIGARETTE SMOKER: ICD-10-CM

## 2024-10-31 DIAGNOSIS — Z23 NEED FOR INFLUENZA VACCINATION: Primary | ICD-10-CM

## 2024-10-31 DIAGNOSIS — Z12.5 PROSTATE CANCER SCREENING: ICD-10-CM

## 2024-10-31 PROCEDURE — 99396 PREV VISIT EST AGE 40-64: CPT | Performed by: FAMILY MEDICINE

## 2024-10-31 PROCEDURE — 90656 IIV3 VACC NO PRSV 0.5 ML IM: CPT | Performed by: FAMILY MEDICINE

## 2024-10-31 PROCEDURE — 90471 IMMUNIZATION ADMIN: CPT | Performed by: FAMILY MEDICINE

## 2024-10-31 NOTE — PROGRESS NOTES
Subjective   Juan Erazo is a 49 y.o. male.     Chief Complaint   Patient presents with    Annual Exam         History of Present Illness   Juan Erazo 49 y.o. male who presents for an Annual Wellness Visit.  he has a history of   Patient Active Problem List   Diagnosis    Healthcare maintenance    Cigarette smoker    Prostate cancer screening    Periumbilical mass    Pain of right heel    Male pattern baldness   .  he has been feeling well.   I  reviewed health maintenance with him as part of my preventative care plan.  Recommend regular dental and eye exams  The following portions of the patient's history were reviewed and updated as appropriate: allergies, current medications, past family history, past medical history, past social history, past surgical history, and problem list.    Review of Systems   Constitutional:  Negative for appetite change, fever and unexpected weight change.   HENT:  Negative for ear pain, facial swelling and sore throat.    Eyes:  Negative for pain and visual disturbance.   Respiratory:  Negative for chest tightness, shortness of breath and wheezing.    Cardiovascular:  Negative for chest pain and palpitations.   Gastrointestinal:  Negative for abdominal pain and blood in stool.   Endocrine: Negative.    Genitourinary:  Negative for difficulty urinating and hematuria.   Musculoskeletal:  Negative for joint swelling.   Neurological:  Negative for tremors, seizures and syncope.   Hematological:  Negative for adenopathy.   Psychiatric/Behavioral: Negative.         Objective   Physical Exam  Vitals and nursing note reviewed.   Constitutional:       Appearance: Normal appearance. He is well-developed. He is not diaphoretic.   HENT:      Head: Normocephalic and atraumatic.      Right Ear: Tympanic membrane, ear canal and external ear normal.      Left Ear: Tympanic membrane, ear canal and external ear normal.   Eyes:      General: Lids are normal. No scleral icterus.     Extraocular  Movements: Extraocular movements intact.      Conjunctiva/sclera: Conjunctivae normal.   Neck:      Thyroid: No thyroid mass or thyromegaly.      Vascular: No carotid bruit or JVD.   Cardiovascular:      Rate and Rhythm: Normal rate and regular rhythm.      Pulses: Normal pulses.           Radial pulses are 2+ on the right side and 2+ on the left side.      Heart sounds: Normal heart sounds. No murmur heard.  Pulmonary:      Effort: Pulmonary effort is normal. No respiratory distress.      Breath sounds: Normal breath sounds.   Abdominal:      Palpations: Abdomen is soft.   Musculoskeletal:      Cervical back: Normal range of motion.      Right lower leg: No edema.      Left lower leg: No edema.   Skin:     General: Skin is warm and dry.      Coloration: Skin is not pale.      Findings: No erythema or rash.   Neurological:      General: No focal deficit present.      Mental Status: He is alert and oriented to person, place, and time.      Sensory: No sensory deficit.      Deep Tendon Reflexes: Reflexes are normal and symmetric.   Psychiatric:         Mood and Affect: Mood normal.         Behavior: Behavior normal. Behavior is cooperative.         Thought Content: Thought content normal.         Judgment: Judgment normal.         Assessment & Plan   Diagnoses and all orders for this visit:    1. Need for influenza vaccination (Primary)  -     Fluzone >6mos ([id number])    2. Prostate cancer screening  -     PSA Screen; Future    3. Healthcare maintenance  -     TSH; Future  -     Comprehensive Metabolic Panel; Future  -     Lipid Panel; Future    4. Cigarette smoker    Recommend smoking cessation  Attempt at healthy lifestyle calorie reappropriate diet and regular physical activity  Education provided regarding prevention of serious illness with immunizations patient will receive flu vaccine today  Education provided regarding early detection screening PSA will be drawn today colorectal cancer screening is due  November 2025  Otherwise as needed or preventatively annually

## 2025-01-09 RX ORDER — FINASTERIDE 1 MG/1
1 TABLET, FILM COATED ORAL DAILY
Qty: 90 TABLET | Refills: 3 | Status: SHIPPED | OUTPATIENT
Start: 2025-01-09

## 2025-01-20 ENCOUNTER — OFFICE VISIT (OUTPATIENT)
Dept: INTERNAL MEDICINE | Facility: CLINIC | Age: 50
End: 2025-01-20
Payer: COMMERCIAL

## 2025-01-20 VITALS
BODY MASS INDEX: 28.14 KG/M2 | SYSTOLIC BLOOD PRESSURE: 126 MMHG | RESPIRATION RATE: 14 BRPM | DIASTOLIC BLOOD PRESSURE: 72 MMHG | OXYGEN SATURATION: 100 % | HEART RATE: 80 BPM | HEIGHT: 71 IN | TEMPERATURE: 97.8 F | WEIGHT: 201 LBS

## 2025-01-20 DIAGNOSIS — J40 BRONCHITIS: ICD-10-CM

## 2025-01-20 DIAGNOSIS — R05.1 ACUTE COUGH: Primary | ICD-10-CM

## 2025-01-20 LAB
EXPIRATION DATE: NORMAL
FLUAV AG UPPER RESP QL IA.RAPID: NOT DETECTED
FLUBV AG UPPER RESP QL IA.RAPID: NOT DETECTED
INTERNAL CONTROL: NORMAL
Lab: NORMAL
SARS-COV-2 AG UPPER RESP QL IA.RAPID: NOT DETECTED

## 2025-01-20 PROCEDURE — 99213 OFFICE O/P EST LOW 20 MIN: CPT | Performed by: FAMILY MEDICINE

## 2025-01-20 PROCEDURE — 87428 SARSCOV & INF VIR A&B AG IA: CPT | Performed by: FAMILY MEDICINE

## 2025-01-20 RX ORDER — AZITHROMYCIN 250 MG/1
TABLET, FILM COATED ORAL
Qty: 6 TABLET | Refills: 0 | Status: SHIPPED | OUTPATIENT
Start: 2025-01-20

## 2025-01-20 RX ORDER — ALBUTEROL SULFATE 90 UG/1
2 INHALANT RESPIRATORY (INHALATION) EVERY 6 HOURS PRN
Qty: 6.7 G | Refills: 0 | Status: SHIPPED | OUTPATIENT
Start: 2025-01-20

## 2025-01-20 NOTE — PROGRESS NOTES
"Chief Complaint  Cough and Headache    Subjective        Juan Erazo presents to [facility]  History of Present Illness  Patient appointment for acute problem of cough for the last couple weeks becoming more productive worse at night  Had some upper respiratory symptoms earlier gradually improving  He is a smoker and is trying to quit  Objective   Vital Signs:  /72   Pulse 80   Temp 97.8 °F (36.6 °C)   Resp 14   Ht 180.3 cm (70.98\")   Wt 91.2 kg (201 lb)   SpO2 100%   BMI 28.05 kg/m²   Estimated body mass index is 28.05 kg/m² as calculated from the following:    Height as of this encounter: 180.3 cm (70.98\").    Weight as of this encounter: 91.2 kg (201 lb).          Physical Exam  Vitals and nursing note reviewed.   Constitutional:       Appearance: Normal appearance.   HENT:      Head: Normocephalic and atraumatic.      Right Ear: Tympanic membrane, ear canal and external ear normal.      Left Ear: Tympanic membrane, ear canal and external ear normal.      Mouth/Throat:      Pharynx: No posterior oropharyngeal erythema.   Cardiovascular:      Rate and Rhythm: Normal rate and regular rhythm.      Pulses: Normal pulses.      Heart sounds: Normal heart sounds.   Pulmonary:      Effort: Pulmonary effort is normal.      Breath sounds: Normal breath sounds.   Lymphadenopathy:      Cervical: No cervical adenopathy.   Neurological:      Mental Status: He is alert.   Psychiatric:         Mood and Affect: Mood normal.         Behavior: Behavior normal.         Thought Content: Thought content normal.         Judgment: Judgment normal.        Result Review :                Assessment and Plan   Diagnoses and all orders for this visit:    1. Acute cough (Primary)  -     POCT SARS-CoV-2 Antigen LORETO + Flu    2. Bronchitis  -     azithromycin (Zithromax Z-Saud) 250 MG tablet; Take 2 tablets by mouth on day 1, then 1 tablet daily on days 2-5  Dispense: 6 tablet; Refill: 0  -     " albuterol sulfate  (90 Base) MCG/ACT inhaler; Inhale 2 puffs Every 6 (Six) Hours As Needed for Wheezing or Shortness of Air (cough).  Dispense: 6.7 g; Refill: 0    Recommend patient have fasting labs drawn as ordered in October         Follow Up   Return if symptoms worsen or fail to improve.  Patient was given instructions and counseling regarding his condition or for health maintenance advice. Please see specific information pulled into the AVS if appropriate.

## 2025-04-18 RX ORDER — FINASTERIDE 1 MG/1
1 TABLET, FILM COATED ORAL DAILY
Qty: 90 TABLET | Refills: 3 | Status: SHIPPED | OUTPATIENT
Start: 2025-04-18

## 2025-07-31 RX ORDER — FINASTERIDE 1 MG/1
1 TABLET, FILM COATED ORAL DAILY
Qty: 90 TABLET | Refills: 3 | Status: SHIPPED | OUTPATIENT
Start: 2025-07-31

## 2025-08-01 ENCOUNTER — PRIOR AUTHORIZATION (OUTPATIENT)
Dept: INTERNAL MEDICINE | Facility: CLINIC | Age: 50
End: 2025-08-01
Payer: COMMERCIAL